# Patient Record
Sex: FEMALE | Race: WHITE | Employment: OTHER | URBAN - METROPOLITAN AREA
[De-identification: names, ages, dates, MRNs, and addresses within clinical notes are randomized per-mention and may not be internally consistent; named-entity substitution may affect disease eponyms.]

---

## 2017-02-20 ENCOUNTER — OFFICE VISIT (OUTPATIENT)
Dept: FAMILY MEDICINE CLINIC | Age: 77
End: 2017-02-20

## 2017-02-20 VITALS
HEIGHT: 63 IN | RESPIRATION RATE: 18 BRPM | HEART RATE: 91 BPM | TEMPERATURE: 96.4 F | DIASTOLIC BLOOD PRESSURE: 96 MMHG | SYSTOLIC BLOOD PRESSURE: 171 MMHG | OXYGEN SATURATION: 94 % | BODY MASS INDEX: 20.71 KG/M2 | WEIGHT: 116.9 LBS

## 2017-02-20 DIAGNOSIS — F17.200 TOBACCO USE DISORDER: ICD-10-CM

## 2017-02-20 DIAGNOSIS — Z80.41 FH: OVARIAN CANCER: ICD-10-CM

## 2017-02-20 DIAGNOSIS — J44.9 CHRONIC OBSTRUCTIVE PULMONARY DISEASE, UNSPECIFIED COPD TYPE (HCC): ICD-10-CM

## 2017-02-20 DIAGNOSIS — Z00.00 MEDICARE ANNUAL WELLNESS VISIT, SUBSEQUENT: Primary | ICD-10-CM

## 2017-02-20 DIAGNOSIS — Z13.39 SCREENING FOR ALCOHOLISM: ICD-10-CM

## 2017-02-20 DIAGNOSIS — E03.9 HYPOTHYROIDISM, UNSPECIFIED TYPE: ICD-10-CM

## 2017-02-20 DIAGNOSIS — E78.00 PURE HYPERCHOLESTEROLEMIA: ICD-10-CM

## 2017-02-20 DIAGNOSIS — R31.9 HEMATURIA: ICD-10-CM

## 2017-02-20 DIAGNOSIS — E55.9 VITAMIN D DEFICIENCY: ICD-10-CM

## 2017-02-20 NOTE — MR AVS SNAPSHOT
Visit Information Date & Time Provider Department Dept. Phone Encounter #  
 2/20/2017  8:15 AM Theora Alpers, MD [facility] [phone] [id number] Follow-up Instructions Return in about 8 months (around 10/20/2017) for Annual labs. Upcoming Health Maintenance Date Due  
 MEDICARE YEARLY EXAM 2/17/2017 GLAUCOMA SCREENING Q2Y 5/21/2017* BREAST CANCER SCRN MAMMOGRAM 5/21/2017* DTaP/Tdap/Td series (2 - Td) 1/21/2026 *Topic was postponed. The date shown is not the original due date. Allergies as of 2/20/2017  Review Complete On: 2/20/2017 By: Theora Alpers, MD  
  
 Severity Noted Reaction Type Reactions Fosamax [Alendronate] High 01/22/2013   Side Effect Other (comments) Jaw necrosis Combivent [Ipratropium-albuterol] Medium 01/22/2013   Side Effect Other (comments) Advil [Ibuprofen]  09/09/2009    Other (comments) Very sleepy Spiriva With Handihaler [Tiotropium Bromide]  01/22/2013    Swelling Sinus swelling Current Immunizations  Reviewed on 2/17/2016 Name Date Influenza High Dose Vaccine PF 8/16/2016, 10/4/2014 Influenza Vaccine 8/27/2015, 9/30/2013 Pneumococcal Conjugate (PCV-13) 4/23/2014 Pneumococcal Vaccine (Unspecified Type) 1/1/2004, 1/1/1992 TD Vaccine 5/29/2008 Tdap 1/21/2016 Not reviewed this visit You Were Diagnosed With   
  
 Codes Comments Medicare annual wellness visit, subsequent    -  Primary ICD-10-CM: Z00.00 ICD-9-CM: V70.0 Screening for alcoholism     ICD-10-CM: Z13.89 ICD-9-CM: V79.1 Elevated BP     ICD-10-CM: I10 
ICD-9-CM: 401.9 Hematuria     ICD-10-CM: R31.9 ICD-9-CM: 599.70 FH: ovarian cancer     ICD-10-CM: Z80.41 ICD-9-CM: V16.41 Vitamin D deficiency     ICD-10-CM: E55.9 ICD-9-CM: 268.9 Pure hypercholesterolemia     ICD-10-CM: E78.00 ICD-9-CM: 272.0  Chronic obstructive pulmonary disease, unspecified COPD type ([address] ICD-10-CM: J44.9 ICD-9-CM: 768 Tobacco use disorder     ICD-10-CM: F17.200 ICD-9-CM: 305.1 Hypothyroidism, unspecified type     ICD-10-CM: E03.9 ICD-9-CM: 321. 9 Vitals BP Pulse Temp Resp Height(growth percentile) Weight(growth percentile) (!) 171/96 (BP 1 Location: Left arm, BP Patient Position: Sitting) 91 96.4 °F (35.8 °C) (Oral) 18 5' 3\" (1.6 m) 116 lb 14.4 oz (53 kg) SpO2 BMI OB Status Smoking Status 94% 20.71 kg/m2 Postmenopausal Current Every Day Smoker Vitals History BMI and BSA Data Body Mass Index Body Surface Area 20.71 kg/m 2 1.53 m 2 Preferred Pharmacy Pharmacy Name Phone [facility] [address], [address] [phone] Your Updated Medication List  
  
   
This list is accurate as of: 2/20/17  9:05 AM.  Always use your most recent med list.  
  
  
  
  
 aspirin delayed-release 81 mg tablet Take  by mouth daily. ATROVENT HFA 17 mcg/actuation inhaler Generic drug:  ipratropium  
as needed. CENTRUM SILVER Tab tablet Generic drug:  multivitamins-minerals-lutein Take 1 Tab by mouth daily. cod liver oil Cap Take 1 Cap by mouth daily. denosumab 60 mg/mL injection Commonly known as:  Belkis Croon 60 mg by SubCUTAneous route every 6 months. levothyroxine 88 mcg tablet Commonly known as:  SYNTHROID  
TAKE 1 TABLET BY MOUTH PAL Y BEFORE BREAKFAST 6 DAYS PER WEEK.  
  
 red yeast rice extract 600 mg Cap Take 1,200 mg by mouth now. VITAMIN D3 2,000 unit Tab Generic drug:  cholecalciferol (vitamin D3) Take 2,000 Units by mouth daily. Follow-up Instructions Return in about 8 months (around 10/20/2017) for Annual labs. Introducing [facility]! Miryam Newman introduces [employer] patient portal. Now you can access parts of your medical record, email your doctor's office, and request medication refills online.    
 
1. In your internet browser, go to [email or web address] 2. Click on the First Time User? Click Here link in the Sign In box. You will see the New Member Sign Up page. 3. Enter your Odysii Access Code exactly as it appears below. You will not need to use this code after youve completed the sign-up process. If you do not sign up before the expiration date, you must request a new code. · Odysii Access Code: [email or web address] Expires: 5/21/2017  8:15 AM 
 
4. Enter the last four digits of your Social Security Number (xxxx) and Date of Birth (mm/dd/yyyy) as indicated and click Submit. You will be taken to the next sign-up page. 5. Create a YR Freet ID. This will be your Odysii login ID and cannot be changed, so think of one that is secure and easy to remember. 6. Create a Odysii password. You can change your password at any time. 7. Enter your Password Reset Question and Answer. This can be used at a later time if you forget your password. 8. Enter your e-mail address. You will receive e-mail notification when new information is available in [address] 9. Click Sign Up. You can now view and download portions of your medical record. 10. Click the Download Summary menu link to download a portable copy of your medical information. If you have questions, please visit the Frequently Asked Questions section of the Odysii website. Remember, Odysii is NOT to be used for urgent needs. For medical emergencies, dial 911. Now available from your iPhone and Android! Please provide this summary of care documentation to your next provider. Your primary care clinician is listed as [phone] If you have any questions after today's visit, please call [phone].

## 2017-02-20 NOTE — ACP (ADVANCE CARE PLANNING)
Advance Care Planning    Advance Care Planning (ACP) Provider Conversation Snapshot    Date of ACP Conversation: 02/20/17  Persons included in Conversation:  patient  Length of ACP Conversation in minutes:  <16 minutes (Non-Billable)    Authorized Decision Maker (if patient is incapable of making informed decisions): This person is:   Friend          For Patients with Decision Making Capacity:   Values/Goals: Exploration of values, goals, and preferences if recovery is not expected, even with continued medical treatment in the event of:  Imminent death  Severe, permanent brain injury    Conversation Outcomes / Follow-Up Plan:   Pt has DNR at home with contact info. Declines bringing to office to put on chart.

## 2017-02-20 NOTE — PROGRESS NOTES
HISTORY OF PRESENT ILLNESS  Jose Stuart is a 76 y.o. female. HPI   Here for MWV. Review of Systems   Constitutional: Positive for weight loss. HENT: Negative. Eyes: Negative. Respiratory: Positive for cough and shortness of breath. Cardiovascular: Negative. Gastrointestinal: Negative. Genitourinary: Negative. Musculoskeletal: Negative. Skin: Negative. Neurological: Negative. Endo/Heme/Allergies: Negative. Psychiatric/Behavioral: Negative. Physical Exam   NA    ASSESSMENT and PLAN  See below     This is a Subsequent Medicare Annual Wellness Visit providing Personalized Prevention Plan Services (PPPS) (Performed 12 months after initial AWV and PPPS )    I have reviewed the patient's medical history in detail and updated the computerized patient record. Eye doctor 2 x annually for chronic steroid use by pulm. No dentist as full lower. Never had colonoscopy and no intention to do nor to do stool cards. Mammo past yr. DEXA past yr. No signif hx past yr.     History     Past Medical History   Diagnosis Date    Abnormal chest CT      7/14/14 notes rec'd radow-nodules present    Abscess, ear canal 01/31/2017     Dr Heriberto Gusman  left ear    Breast calcifications [place]     COPD (chronic obstructive pulmonary disease) ([facility] 09/09/2009     Dr Twan Herring   9/26/16    Dizziness 7/21/15     [facility] notes [facility]    Heart murmur 10/28/09     grade I/VI @ [facility]    Hematuria - cause not known     Hypercholesterolemia     Hypoxemia      radow uses O2 at night     Osteoporosis                 3/20/25 note     1/13/15 dexa scan rec'd- ref to [address] Pneumothorax 7/21/14 8/20/14     admission notes from 2 visits [facility]    Pneumothorax, chronic      Dr. Kathleen Chambers Thyroid disease     Tobacco use disorder 9/9/2009    Unspecified hypothyroidism 9/9/2009    Vitamin D deficiency       Past Surgical History Procedure Laterality Date    Pr breast surgery procedure unlisted  '70s-90's     sclerosing adenosis x 5 bilat. dr. Arleen Arreola Pr chest surgery procedure unlisted  '70-80s     Repaired spon. pneumothoraces x 2    Hx heent  as child     T&A    Hx heent  many years ago/dr unknown     bilateral cataracts, rt first,     Hx heent  unknown/many years ago over 19     dental extractions, Dr. Erik Fraga Hx orthopaedic  age 27's     tore rt knee cartilage     Current Outpatient Prescriptions   Medication Sig Dispense Refill    levothyroxine (SYNTHROID) 88 mcg tablet TAKE 1 TABLET BY MOUTH PAL Y BEFORE BREAKFAST 6 DAYS PER WEEK. 90 Tab 2    denosumab (PROLIA) 60 mg/mL injection 60 mg by SubCUTAneous route every 6 months.  ATROVENT HFA 17 mcg/actuation inhaler as needed. 3    multivitamins-minerals-lutein (CENTRUM SILVER) tab tablet Take 1 Tab by mouth daily.  cod liver oil cap Take 1 Cap by mouth daily.  red yeast rice extract 600 mg cap Take 1,200 mg by mouth now.  cholecalciferol, vitamin D3, (VITAMIN D3) 2,000 unit Tab Take 2,000 Units by mouth daily.  aspirin delayed-release 81 mg tablet Take  by mouth daily.          Allergies   Allergen Reactions    Fosamax [Alendronate] Other (comments)     Jaw necrosis    Combivent [Ipratropium-Albuterol] Other (comments)    Advil [Ibuprofen] Other (comments)     Very sleepy    Spiriva With Handihaler [Tiotropium Bromide] Swelling     Sinus swelling     Family History   Problem Relation Age of Onset    Cancer Mother      ?uterine    Cancer Maternal Grandmother      stomach    Cancer Brother      Social History   Substance Use Topics    Smoking status: Current Every Day Smoker     Packs/day: 1.00     Years: 60.00    Smokeless tobacco: Never Used    Alcohol use No     Patient Active Problem List   Diagnosis Code    COPD (chronic obstructive pulmonary disease) ([facility]) J44.9    Tobacco use disorder F17.200    Hypothyroidism E03.9    Pure hypercholesterolemia E78.00    FH: ovarian cancer Z80.41    Vitamin D deficiency E55.9    Hematuria R31.9    Elevated BP I10       Depression Risk Factor Screening:     PHQ 2 / 9, over the last two weeks 2/20/2017   Little interest or pleasure in doing things Not at all   Feeling down, depressed or hopeless Not at all   Total Score PHQ 2 0     Alcohol Risk Factor Screening: On any occasion during the past 3 months, have you had more than 3 drinks containing alcohol? Not applicable    Do you average more than 7 drinks per week? Not applicable    Non drinker    Functional Ability and Level of Safety:     Hearing Loss   borderline normal    Activities of Daily Living   Self-care. Requires assistance with: no ADLs    Fall Risk     Fall Risk Assessment, last 12 mths 2/20/2017   Able to walk? Yes   Fall in past 12 months? No     Abuse Screen   Patient is not abused    Review of Systems   See above    Physical Examination     Evaluation of Cognitive Function:  Mood/affect:  neutral  Appearance: age appropriate, casually dressed and within normal Limits  Family member/caregiver input: NA    No exam performed today, NA. Patient Care Team:  Maricruz Goodrich MD as PCP - Marky Herr MD (Pulmonary Disease)  Naomie Roque MD (Internal Medicine)  Colton Ivey MD (Ophthalmology)    Advice/Referrals/Counseling   Education and counseling provided:  Are appropriate based on today's review and evaluation  End-of-Life planning (with patient's consent)  Pneumococcal Vaccine  Influenza Vaccine  Screening Mammography  Cardiovascular screening blood test  Bone mass measurement (DEXA)  Screening for glaucoma  Diabetes screening test    Assessment/Plan       ICD-10-CM ICD-9-CM    1. Medicare annual wellness visit, subsequent Z00.00 V70.0    2. Screening for alcoholism Z13.89 V79.1    3. Elevated BP I10 401.9    4. Hematuria R31.9 599.70    5. FH: ovarian cancer Z80.41 V16.41    6.  Vitamin D deficiency E55.9 268.9    7. Pure hypercholesterolemia E78.00 272.0    8. Chronic obstructive pulmonary disease, unspecified COPD type ([facility] J44.9 496    9. Tobacco use disorder F17.200 305.1    10. Hypothyroidism, unspecified type E03.9 244.9      Follow-up Disposition:  Return in about 8 months (around 10/20/2017) for Annual labs. Kurtis Sin

## 2017-02-20 NOTE — PROGRESS NOTES
Chief Complaint   Patient presents with   [facility] Annual Wellness Visit     1. Have you been to the ER, urgent care clinic since your last visit? Hospitalized since your last visit? No    2. Have you seen or consulted any other health care providers outside of the [address] [place] [address] since your last visit? Include any pap smears or colon screening. Yes When: [address] Where: 1/20/17 Reason for visit: Judith Maria Elenat and Bone Density. I have reviewed Health Maintenance with the patient and updated. Patient has a Living Will. Family member is aware of her wishes.

## 2017-07-10 RX ORDER — LEVOTHYROXINE SODIUM 88 UG/1
TABLET ORAL
Qty: 90 TAB | Refills: 0 | Status: SHIPPED | OUTPATIENT
Start: 2017-07-10 | End: 2018-02-21 | Stop reason: DRUGHIGH

## 2017-07-10 NOTE — TELEPHONE ENCOUNTER
She has an appt on the schedule in October for follow up-she has her [address] in Feb. Labs due with the October visit

## 2017-07-11 ENCOUNTER — TELEPHONE (OUTPATIENT)
Dept: FAMILY MEDICINE CLINIC | Age: 77
End: 2017-07-11

## 2017-07-11 NOTE — TELEPHONE ENCOUNTER
We rec'd the request electronically and okay'd that way.  Left message at [place] that it was to be for Synthroid

## 2017-07-11 NOTE — TELEPHONE ENCOUNTER
Patient calling to say she went to the pharmacy to  her thyroid medication and its the wrong medication. She is requesting a return call. Her contact # is [phone].

## 2017-08-17 ENCOUNTER — OFFICE VISIT (OUTPATIENT)
Dept: FAMILY MEDICINE CLINIC | Age: 77
End: 2017-08-17

## 2017-08-17 VITALS
SYSTOLIC BLOOD PRESSURE: 108 MMHG | WEIGHT: 114 LBS | HEIGHT: 63 IN | OXYGEN SATURATION: 96 % | HEART RATE: 94 BPM | TEMPERATURE: 97.2 F | RESPIRATION RATE: 18 BRPM | BODY MASS INDEX: 20.2 KG/M2 | DIASTOLIC BLOOD PRESSURE: 79 MMHG

## 2017-08-17 DIAGNOSIS — F17.200 TOBACCO USE DISORDER: ICD-10-CM

## 2017-08-17 DIAGNOSIS — J44.9 CHRONIC OBSTRUCTIVE PULMONARY DISEASE, UNSPECIFIED COPD TYPE (HCC): Primary | ICD-10-CM

## 2017-08-17 RX ORDER — IPRATROPIUM BROMIDE AND ALBUTEROL SULFATE 2.5; .5 MG/3ML; MG/3ML
3 SOLUTION RESPIRATORY (INHALATION) 2 TIMES DAILY
COMMUNITY

## 2017-08-17 NOTE — PROGRESS NOTES
Chief Complaint   Patient presents with   [facility] Follow Up     [facility] EMERGENCY [facility]ENTER admitted 7/28/17 - 8/1/17 for dyspnea     There are no preventive care reminders to display for this patient. Coordination of Care Questions    1. Have you been to the ER, urgent care clinic since your last visit? No       Hospitalized since your last visit? [facility] see above    2. Have you seen or consulted any other health care providers outside of the [address] [place] [address] since your last visit? Include any pap smears or colon screening.  No

## 2017-08-17 NOTE — PROGRESS NOTES
Breathing pb began day before went to ER as got worse. Transported by EMTs. Put back on neb. Pt doing BID with benefit. Pulm is Dr. Anahi Cook. Has f/u appt in Oct.  Much improved. Visit Vitals    /79 (BP 1 Location: Left arm, BP Patient Position: Sitting)    Pulse 94    Temp 97.2 °F (36.2 °C) (Oral)    Resp 18    Ht 5' 3\" (1.6 m)    Wt 114 lb (51.7 kg)    SpO2 96%    BMI 20.19 kg/m2       Patient alert and cooperative. Distant breath sounds, but otherwise clear. Assessment:  1. Recent hospitalization for COPD exacerbation. Plan:  1. Continue current meds and follow up with pulmonary scheduled. 2. Due back here in January for annual visit. 3. Follow otherwise here prn.

## 2017-10-20 ENCOUNTER — LAB ONLY (OUTPATIENT)
Dept: FAMILY MEDICINE CLINIC | Age: 77
End: 2017-10-20

## 2017-10-20 DIAGNOSIS — E78.00 PURE HYPERCHOLESTEROLEMIA: ICD-10-CM

## 2017-10-20 DIAGNOSIS — E03.9 HYPOTHYROIDISM, UNSPECIFIED TYPE: Primary | ICD-10-CM

## 2017-10-20 DIAGNOSIS — R31.9 HEMATURIA, UNSPECIFIED TYPE: ICD-10-CM

## 2017-10-20 DIAGNOSIS — E55.9 VITAMIN D DEFICIENCY: ICD-10-CM

## 2017-12-05 ENCOUNTER — PATIENT OUTREACH (OUTPATIENT)
Dept: FAMILY MEDICINE CLINIC | Age: 77
End: 2017-12-05

## 2017-12-05 NOTE — PROGRESS NOTES
NN Note    - Pt listed on today's CDR (MSSP-P). Admission to [facility] 12/4/17 (Observation Status)  - ClariFI system reviewed. Printed copy of 12/4/17 ED Provider Report, H&P, Labs, CXR, 12/5/17 Cardio Progress Note. Records reviewed & forwarded to PCP  - Per documentation Dx- A-Fib w/ RVR, rate 189. Cardioversion in ED. Converted to sinus rhythm. Sinus tach, rate 108    - Labs- POC Lactic Acid- 2.89 (H) @ 1828 PM. Rechecked- 1.6 @ 2300. WBC- 11.87(H). UA- Ketones- trace, blood- small, Protein- 30 mg/dL, Leuk Sabrina- trace, RBC-3-5.  - Plan- admit under observation on ASA. Consider anticoagulation if has recurrent A-Fib. D/C in morning if clinically stable  - Per 12/5/17 Cardio Progress report- not stable for discharge. Recurrent A-Fib.  Digoxin IV x2, oral Dilt today, Xarelto po.  - NN to continue to monitor ClariFI system for D/C date/disposition/plan & SHANIKA f/u

## 2017-12-12 ENCOUNTER — PATIENT OUTREACH (OUTPATIENT)
Dept: FAMILY MEDICINE CLINIC | Age: 77
End: 2017-12-12

## 2017-12-12 NOTE — PROGRESS NOTES
Hospital Discharge [facility] Follow-Up    Celestina Lorenz is a 76 y. o.female. Last PCP f/u 17. Referral source: CDR (MSSP-P). Patient hospitalized @ [facility] 17-17. [facility]ack system reviewed. Printed copy of Discharge Summary & Echocardiogram Report. Records to be forwarded to PCP for review. RRAT score: unknown     Diagnosis:  A-Fib with RVR, requiring direct current cardioversion & sedation in the ER    Procedures:  2DEchocardiogram  Cardioversion in ER by ER staff    Discharge Instructions/Plans:  - Disposition- home  - Meds- new- Xarelto 20 mg daily, Diltiazem 24 hr 180 mg. Discontinue: ASA 81 mg. Red Yeast Rice  - Cardio f/u in 7-14 days with Dr Debi RAZA post hospital interactive contact done by telephone 4 business days after discharge (17)    Spoke with pt. Pt ID verified with 2 identifiers, name and . NN introduction. Reason for call stated. Patient's challenges to self management identified: level of motivation re smoking cessation   Medication Management: good adherence, good understanding,    Summary of patients top three problems:     Problem 1: A-Fib- pt reported feeling \"fine\". Denied palpitations. SOB @ baseline. \"Not any more than usual\". Has home BP monitor. Hasn't checked BP since d/c. Started on [facility]. Pt reported still taking ASA 81mg. Advised med d/c'd @ d/c. Pt not aware. Also still taking Red Yeast Rice. Advised this was d/c'd @ discharge. \"Why, it's for Cholesterol\". NN advised pt discuss with Cardio. Pt verbalized understanding. Plan- pt t contact [address] [facility] on 17 to inquire about ASA & Red Yeast Rice, discontinue or continue. Post hosp Cardio f/u scheduled for 17 @ 9:20 AM with Dr Debi Moore. Problem 2: COPD- pt reported breathing \"back to my normal\". Has home Pulse Ox monitor. O2 Sat \"98%\". Has Nebulizer. Uses PRN. Last used today ~ 12 PM. Had been out. Exposed to cold air. Felt better after Neb tx.  Uses O2 @ night 2.5-3L/min via NC. lCOPD managed by Dr Josué Meza @ [facility]. Last ov 10/2/17. Given trial of Incruse Ellipta 62.5 mg. Unable to tolerate after 3-4 days. Notified Pulmon office. Advised to continue with Atrovent Inhaler & Neb txs. Next Pulmon f/u April 2018 (6 months after last f/u on 10/2/17). Problem 3: Tobacco Abuse- pt reported continues to smoke 1 pack/day. Acknowledges has been counseled to quit. Understands smoking contributes to disease progression & complications. Has tried Hypnosis, Acupuncture, & program similar to Quit Now w/o success. \"It's a habit I'm not ready to give up. It's selfish. It's the only vice I have\". Pt again counseled om importance of quitting. Pt verbalized understanding. Plan- pt agreeable with reaching out to this NN if/when decides to attempt cessation. Advance Care Planning:   Patient was offered the opportunity to discuss advance care planning:  yes     Does patient have an Advance Directive:  Pt reported does have, but declines to provide copy. If no, did you provide information on Advance Care Planning?  [id number] [address] [place], Referrals, and Durable Medical Equipment: has home O2, Nebulizer. Home BP monitor, home Pulse Ox. Support System: friends    Goals Addressed      Knowledge and adherence of prescribed medication (ie. action, side effects, missed dose, etc.).                  12/12/17- pt to contact Owned it office on 12/13/17 re clarification on discontinuation/continuation of ASA 81 mg & Red Yeast Rice-ID       Patient verbalizes understanding of self-management goals of living with COPD.                  12/12/17- pt reported breathing \"back to my normal\". Has home Pulse Ox monitor. O2 Sat \"98%\". Has Nebulizer. Uses PRN. Last used today ~ 12 PM. Had been out. Exposed to cold air. Felt better after Neb tx. Uses O2 @ night 2.5-3L/min via NC. COPD managed by Dr Josué Meza @ [facility]. Last ov 10/2/17. Given trial of Incruse Ellipta 62.5 mg.  Unable to tolerate after 3-4 days. Notified Pulmon office. Advised to continue with Atrovent Inhaler & Neb txs. Next Pulmon f/u April 2018 (6 months after last f/u on 10/2/17)-ID.  Patient/ Verbalizes Understanding of self-management of A-Fib (pt-stated)                  12/12/17- take [facility] Xarelto as prescribed. Familiarize self on red flags of OAC ie bleeding & when to contact Cardio. Familiarize self on s/s of uncontrolled A-Fib & when to contact Cardio. Monitor BP & pulse daily. Keep log. Present log @ Cardio f/u for review. Pt verbalized understanding-ID        Prevent complications post hospitalization. 12/12/17-  NN /u during 30 day post hosp SHANIKA period per protocol. Collaborate with Specialists as needed-ID       Smoking cessation. 12/12/17- pt reported continues to smoke 1 pack/day. Acknowledges has been counseled to quit. Understands smoking contributes to disease progression & complications. Has tried Hypnosis, Acupuncture, & program similar to Quit Now w/o success. \"It's a habit I'm not ready to give up. It's selfish. It's the only vice I have\". Pt again counseled om importance of quitting. Pt verbalized understanding. Plan- pt agreeable with reaching out to this NN if/when decides to attempt cessation-ID. Follow up appointments:  Cardio 12/22/17. PCP f/u scheduled for 2/21/18 with Dr Junie Fernandes for annual [address] (last done 2/20/17). Patient verbalized understanding of all information discussed.    Patient has this Nurse Navigator's contact information for any further questions, concerns, or needs.

## 2017-12-13 NOTE — ACP (ADVANCE CARE PLANNING)
Pt reported has an ACP document. Declines to provide copy to this office. \"The person who needs to know about it does. I have the document @ home. I want to keep it this way\".

## 2018-02-01 LAB — CREATININE, EXTERNAL: 0.82

## 2018-02-21 ENCOUNTER — OFFICE VISIT (OUTPATIENT)
Dept: FAMILY MEDICINE CLINIC | Age: 78
End: 2018-02-21

## 2018-02-21 VITALS
RESPIRATION RATE: 18 BRPM | TEMPERATURE: 98.1 F | BODY MASS INDEX: 20 KG/M2 | SYSTOLIC BLOOD PRESSURE: 149 MMHG | HEIGHT: 63 IN | OXYGEN SATURATION: 91 % | HEART RATE: 65 BPM | DIASTOLIC BLOOD PRESSURE: 96 MMHG | WEIGHT: 112.9 LBS

## 2018-02-21 DIAGNOSIS — E03.9 HYPOTHYROIDISM, UNSPECIFIED TYPE: ICD-10-CM

## 2018-02-21 DIAGNOSIS — E55.9 VITAMIN D DEFICIENCY: ICD-10-CM

## 2018-02-21 DIAGNOSIS — Z00.00 MEDICARE ANNUAL WELLNESS VISIT, SUBSEQUENT: Primary | ICD-10-CM

## 2018-02-21 DIAGNOSIS — E78.00 PURE HYPERCHOLESTEROLEMIA: ICD-10-CM

## 2018-02-21 DIAGNOSIS — F17.200 TOBACCO USE DISORDER: ICD-10-CM

## 2018-02-21 DIAGNOSIS — J44.9 CHRONIC OBSTRUCTIVE PULMONARY DISEASE, UNSPECIFIED COPD TYPE (HCC): ICD-10-CM

## 2018-02-21 RX ORDER — LEVOTHYROXINE SODIUM 75 UG/1
TABLET ORAL
Refills: 0 | COMMUNITY
Start: 2018-02-05

## 2018-02-21 NOTE — MR AVS SNAPSHOT
[address] [place] 
[address] [place] [address] 
[phone] Patient: Sara Yadav MRN:  [id number] Visit Information Date & Time Provider Department Dept. Phone Encounter #  
 2/21/2018 10:20 AM Mary Abdi MD [facility] [phone] [id number] Follow-up Instructions Return in about 1 year (around 2/21/2019) for [address] Routing History Upcoming Health Maintenance Date Due  
 MEDICARE YEARLY EXAM 2/21/2018 BREAST CANCER SCRN MAMMOGRAM 5/22/2018* GLAUCOMA SCREENING Q2Y 2/20/2019 DTaP/Tdap/Td series (2 - Td) 1/21/2026 *Topic was postponed. The date shown is not the original due date. Allergies as of 2/21/2018  Review Complete On: 2/21/2018 By: Mary Abdi MD  
  
 Severity Noted Reaction Type Reactions Fosamax [Alendronate] High 01/22/2013   Side Effect Other (comments) Jaw necrosis Combivent [Ipratropium-albuterol] Medium 01/22/2013   Side Effect Other (comments) Advil [Ibuprofen]  09/09/2009    Other (comments) Very sleepy Corticosteroids (Glucocorticoids)  08/17/2017    Shortness of Breath Inhaled only, may take oral  
 Diltiazem  02/21/2018    Other (comments) Got ucky -felt like she was in a fog Spiriva With Handihaler [Tiotropium Bromide]  01/22/2013    Swelling Sinus swelling Xarelto [Rivaroxaban]  02/21/2018    Other (comments) Bleeding Current Immunizations  Reviewed on 2/21/2018 Name Date Influenza High Dose Vaccine PF 9/20/2017, 8/16/2016, 10/4/2014 Influenza Vaccine 8/27/2015, 9/30/2013 Pneumococcal Conjugate (PCV-13) 4/23/2014 Pneumococcal Polysaccharide (PPSV-23) 1/1/2004, 1/1/1992 TD Vaccine 5/29/2008 Tdap 1/21/2016 Reviewed by Jacinto Cuba RN on 2/21/2018 at 10:40 AM  
You Were Diagnosed With   
  
 Codes Comments Tobacco use disorder    -  Primary ICD-10-CM: B97.190 ICD-9-CM: 305.1 Chronic obstructive pulmonary disease, unspecified COPD type ([facility]     ICD-10-CM: J44.9 ICD-9-CM: 333 Hypothyroidism, unspecified type     ICD-10-CM: E03.9 ICD-9-CM: 244.9 Pure hypercholesterolemia     ICD-10-CM: E78.00 ICD-9-CM: 272.0 Vitamin D deficiency     ICD-10-CM: E55.9 ICD-9-CM: 268.9 Vitals BP Pulse Temp Resp Height(growth percentile) Weight(growth percentile) (!) 149/96 (BP 1 Location: Left arm, BP Patient Position: Sitting) 65 98.1 °F (36.7 °C) (Oral) 18 5' 3\" (1.6 m) 112 lb 14.4 oz (51.2 kg) SpO2 BMI OB Status Smoking Status 91% 20 kg/m2 Postmenopausal Current Every Day Smoker Vitals History BMI and BSA Data Body Mass Index Body Surface Area  
 20 kg/m 2 1.51 m 2 Preferred Pharmacy Pharmacy Name Phone [id number]- Joseph Pascual, [address] [place] [phone] Your Updated Medication List  
  
   
This list is accurate as of 2/21/18 11:03 AM.  Always use your most recent med list.  
  
  
  
  
 albuterol-ipratropium 2.5 mg-0.5 mg/3 ml Nebu Commonly known as:  DUO-NEB  
3 mL by Nebulization route two (2) times a day. Indications: CHRONIC OBSTRUCTIVE PULMONARY DISEASE WITH BRONCHOSPASMS  
  
 aspirin delayed-release 81 mg tablet Take  by mouth daily. ATROVENT HFA 17 mcg/actuation inhaler Generic drug:  ipratropium  
as needed. CENTRUM SILVER Tab tablet Generic drug:  multivitamins-minerals-lutein Take 1 Tab by mouth daily. cod liver oil Cap Take 1 Cap by mouth daily. denosumab 60 mg/mL injection Commonly known as:  Aretta Bucy 60 mg by SubCUTAneous route every 6 months. red yeast rice extract 600 mg Cap Take 1,200 mg by mouth daily. SYNTHROID 75 mcg tablet Generic drug:  levothyroxine 1 TABLET BY MOUTH ON AN EMPTY STOMACH IN THE MORNING ONCE A DAY EXCEPT SUNDAY  
  
 VITAMIN D3 2,000 unit Tab Generic drug:  cholecalciferol (vitamin D3) Take 2,000 Units by mouth daily. Follow-up Instructions Return in about 1 year (around 2/21/2019) for Dov [address]. Patient Instructions Medicare Wellness Visit, Female The best way to live healthy is to have a healthy lifestyle by eating a well-balanced diet, exercising regularly, limiting alcohol and stopping smoking. Regular physical exams and screening tests are another way to keep healthy. Preventive exams provided by your health care provider can find health problems before they become diseases or illnesses. Preventive services including immunizations, screening tests, monitoring and exams can help you take care of your own health. All people over age 72 should have a pneumovax  and and a prevnar shot to prevent pneumonia. These are once in a lifetime unless you and your provider decide differently. All people over 65 should have a yearly flu shot and a tetanus vaccine every 10 years. A bone mass density to screen for osteoporosis or thinning of the bones should be done every 2 years after 65. Screening for diabetes mellitus with a blood sugar test should be done every year. Glaucoma is a disease of the eye due to increased ocular pressure that can lead to blindness and it should be done every year by an eye professional. 
 
Cardiovascular screening tests that check for elevated lipids (fatty part of blood) which can lead to heart disease and strokes should be done every 5 years. Colorectal screening that evaluates for blood or polyps in your colon should be done yearly as a stool test or every five years as a flexible sigmoidoscope or every 10 years as a colonoscopy up to age 76. Breast cancer screening with a mammogram is recommended biennially  for women age 54-69.  
 
Screening for cervical cancer with a pap smear and pelvic exam is recommended for women after age 72 years every 2 years up to age 79 or when the provider and patient decide to stop. If there is a history of cervical abnormalities or other increased risk for cancer then the test is recommended yearly. Hepatitis C screening is also recommended for anyone born between [birth year] through [address]. A shingles vaccine is also recommended once in a lifetime after age 61. Your Medicare Wellness Exam is recommended annually. Here is a list of your current Health Maintenance items with a due date: 
Health Maintenance Due Topic Date Due  
 Annual Well Visit  02/21/2018 Introducing [facility]! Hiram Mahoney introduces [employer] patient portal. Now you can access parts of your medical record, email your doctor's office, and request medication refills online. 1. In your internet browser, go to [email or web address]. [email or web address] 2. Click on the First Time User? Click Here link in the Sign In box. You will see the New Member Sign Up page. 3. Enter your [employer] Access Code exactly as it appears below. You will not need to use this code after youve completed the sign-up process. If you do not sign up before the expiration date, you must request a new code. · [employer] Access Code: [email or web address] 
Expires: 5/22/2018 11:03 AM 
 
4. Enter the last four digits of your Social Security Number (xxxx) and Date of Birth (mm/dd/yyyy) as indicated and click Submit. You will be taken to the next sign-up page. 5. Create a [employer] ID. This will be your [employer] login ID and cannot be changed, so think of one that is secure and easy to remember. 6. Create a [employer] password. You can change your password at any time. 7. Enter your Password Reset Question and Answer. This can be used at a later time if you forget your password. 8. Enter your e-mail address. You will receive e-mail notification when new information is available in [address] 9. Click Sign Up. You can now view and download portions of your medical record. 10. Click the Download Summary menu link to download a portable copy of your medical information. If you have questions, please visit the Frequently Asked Questions section of the [email or web address] website. Remember, [email or web address] is NOT to be used for urgent needs. For medical emergencies, dial 911. Now available from your iPhone and Android! Please provide this summary of care documentation to your next provider. Your primary care clinician is listed as Donnie Santana Dr. If you have any questions after today's visit, please call [phone].

## 2018-02-21 NOTE — ASSESSMENT & PLAN NOTE
This condition is managed by Specialist.  Key COPD Medications             albuterol-ipratropium (DUO-NEB) 2.5 mg-0.5 mg/3 ml nebu  (Taking) 3 mL by Nebulization route two (2) times a day. Indications: CHRONIC OBSTRUCTIVE PULMONARY DISEASE WITH BRONCHOSPASMS    ATROVENT HFA 17 mcg/actuation inhaler  (Taking) as needed.         Lab Results   Component Value Date/Time    WBC 11.2 09/28/2016 03:53 PM    HGB 15.4 09/28/2016 03:53 PM    HCT 46.7 09/28/2016 03:53 PM    PLATELET 567 48/68/5926 03:53 PM

## 2018-02-21 NOTE — PROGRESS NOTES
[place]ie [place]  Identified pt with two pt identifiers(name and ). Chief Complaint   Patient presents with   [facility] Annual Wellness Visit       1. Have you been to the ER, urgent care clinic since your last visit? Hospitalized since your last visit? Yes [address] 17    2. Have you seen or consulted any other health care providers outside of the [place] [address] since your last visit? Include any pap smears or colon screening. NO    Today's provider has been notified of reason for visit, vitals and flowsheets obtained on patients.      Patient received paperwork for advance directive during previous visit but has not completed at this time     Reviewed record In preparation for visit, huddled with provider and have obtained necessary documentation      Health Maintenance Due   Topic    BREAST CANCER SCRN MAMMOGRAM     MEDICARE YEARLY EXAM        Wt Readings from Last 3 Encounters:   18 112 lb 14.4 oz (51.2 kg)   17 114 lb (51.7 kg)   17 116 lb 14.4 oz (53 kg)     Temp Readings from Last 3 Encounters:   17 97.2 °F (36.2 °C) (Oral)   17 96.4 °F (35.8 °C) (Oral)   16 98 °F (36.7 °C) (Oral)     BP Readings from Last 3 Encounters:   17 108/79   17 (!) 171/96   16 158/89     Pulse Readings from Last 3 Encounters:   17 94   17 91   16 89     Vitals:    18 1029   Weight: 112 lb 14.4 oz (51.2 kg)   Height: 5' 3\" (1.6 m)   PainSc:   0 - No pain         Learning Assessment:  :     Learning Assessment 2015   PRIMARY LEARNER Patient   HIGHEST LEVEL OF EDUCATION - PRIMARY LEARNER  GRADUATED HIGH SCHOOL OR GED   BARRIERS PRIMARY LEARNER NONE   CO-LEARNER CAREGIVER No   PRIMARY LANGUAGE ENGLISH    NEED No   LEARNER PREFERENCE PRIMARY READING   ANSWERED BY patient   RELATIONSHIP SELF       Depression Screening:  :     PHQ over the last two weeks 2018   Little interest or pleasure in doing things Not at all   Feeling down, depressed or hopeless Not at all   Total Score PHQ 2 0       Fall Risk Assessment:  :     Fall Risk Assessment, last 12 mths 2/21/2018   Able to walk? Yes   Fall in past 12 months? No   Fall with injury? -   Number of falls in past 12 months -   Fall Risk Score -       Abuse Screening:  :     Abuse Screening Questionnaire 8/17/2017 2/17/2016   Do you ever feel afraid of your partner? N N   Are you in a relationship with someone who physically or mentally threatens you? N N   Is it safe for you to go home? Y Y       ADL Screening:  :     ADL Assessment 8/17/2017   Feeding yourself No Help Needed   Getting from bed to chair No Help Needed   Getting dressed No Help Needed   Bathing or showering No Help Needed   Walk across the room (includes cane/walker) No Help Needed   Using the telphone No Help Needed   Taking your medications No Help Needed   Preparing meals No Help Needed   Managing money (expenses/bills) No Help Needed   Moderately strenuous housework (laundry) No Help Needed   Shopping for personal items (toiletries/medicines) No Help Needed   Shopping for groceries No Help Needed   Driving No Help Needed   Climbing a flight of stairs No Help Needed   Getting to places beyond walking distances No Help Needed       Complete Physical Exam Female  Pre-Visit Questions:    1. Do you follow a low fat or low salt diet ? y  2. Do you follow an exercise program? y  3. Have you had your tetanus booster in the last 10 years? y  3. Have you ever had a Pneumonia vaccine? y  11. Do you smoke? y  6. Do you consider yourself overweight? n  7. Do you perform Breast self exam?n  8. Is there a family history of CAD< age 48? n  5. Is there a family history of Cancer? y  8. Do you have any Cancer risks? n  11. Have you had a colonoscopy? n  12. Have you been to your eye doctor past year? y    15. Have you been to your dentist in the last 6 months? n   14. Have you had your flu shot for this season?  y  13.   Have you had a Pap smear in the last 3 years?n  16. Have you had your annual mammogram?y  17. Have you had a bone density scan(DEXA)? y          Medication reconciliation up to date and corrected with patient at this time.

## 2018-02-21 NOTE — PATIENT INSTRUCTIONS

## 2018-02-21 NOTE — PROGRESS NOTES
This is a Subsequent Medicare Annual Wellness Exam (AWV) (Performed 12 months after IPPE or effective date of Medicare Part B enrollment)    I have reviewed the patient's medical history in detail and updated the computerized patient record. Eye doctor  2 x past yr. No dentist.  Full upper and lower. No previous colonoscopy. Declines stool testing. Mammo past yr. DEXA q 2 yrs. Prolia 2 x annually. Pulm 2 x annually. Endo 2 x annually for fu osteoporosis. Card 2 x annually for new onset afib. ER for afib and hosp 3 days at [facility] past Dec.  Had ER and hosp for COPD past Aug.  No other signif hx past yr. History     Past Medical History:   Diagnosis Date    Abnormal chest CT     7/14/14 notes rec'd radow-nodules present    Abscess, ear canal 01/31/2017    Dr Jose Cruz Grissom  left ear    Atrial fibrillation ([facility] 12/04/2017    [place]     COPD (chronic obstructive pulmonary disease) ([facility] 09/09/2009    Dr Harris Bound   4/3/17 f/u note    Dizziness 7/21/15    [facility] notes BPP[facility]    Heart murmur 10/28/09    grade I/VI @ [facility]    Hematuria - cause not known     Hypercholesterolemia     Hypoxemia     radow uses O2 at night     Osteoporosis                 3/20/25 note    1/13/15 dexa scan rec'd- ref to [address] Pneumothorax 7/21/14 8/20/14    admission notes from 2 visits [facility]    Pneumothorax, chronic     Dr. Rosy Francois Screening for glaucoma 02/20/2017    Thyroid disease     Tobacco use disorder 9/9/2009    Unspecified hypothyroidism 9/9/2009    Vitamin D deficiency       Past Surgical History:   Procedure Laterality Date    BREAST SURGERY PROCEDURE UNLISTED  '70s-90's    sclerosing adenosis x 5 bilat. dr. kaylee Monte  '70-80s    Repaired spon.  pneumothoraces x 2    HX HEENT  as child    T&A    HX HEENT  many years ago/dr unknown    bilateral cataracts, rt first,     HX HEENT unknown/many years ago over 20    dental extractions, Dr. Baldev Pineda  age 27's    tore rt knee cartilage     Current Outpatient Prescriptions   Medication Sig Dispense Refill    SYNTHROID 75 mcg tablet 1 TABLET BY MOUTH ON AN EMPTY STOMACH IN THE MORNING ONCE A DAY EXCEPT SUNDAY  0    albuterol-ipratropium (DUO-NEB) 2.5 mg-0.5 mg/3 ml nebu 3 mL by Nebulization route two (2) times a day. Indications: CHRONIC OBSTRUCTIVE PULMONARY DISEASE WITH BRONCHOSPASMS      denosumab (PROLIA) 60 mg/mL injection 60 mg by SubCUTAneous route every 6 months.  ATROVENT HFA 17 mcg/actuation inhaler as needed. 3    multivitamins-minerals-lutein (CENTRUM SILVER) tab tablet Take 1 Tab by mouth daily.  cod liver oil cap Take 1 Cap by mouth daily.  red yeast rice extract 600 mg cap Take 1,200 mg by mouth daily.  cholecalciferol, vitamin D3, (VITAMIN D3) 2,000 unit Tab Take 2,000 Units by mouth daily.  aspirin delayed-release 81 mg tablet Take  by mouth daily.          Allergies   Allergen Reactions    Fosamax [Alendronate] Other (comments)     Jaw necrosis    Combivent [Ipratropium-Albuterol] Other (comments)    Advil [Ibuprofen] Other (comments)     Very sleepy    Corticosteroids (Glucocorticoids) Shortness of Breath     Inhaled only, may take oral    Diltiazem Other (comments)     Got ucky -felt like she was in a fog    Spiriva With Handihaler [Tiotropium Bromide] Swelling     Sinus swelling    Xarelto [Rivaroxaban] Other (comments)     Bleeding     Family History   Problem Relation Age of Onset    Cancer Mother      ?uterine    Cancer Maternal Grandmother      stomach    Cancer Brother      Social History   Substance Use Topics    Smoking status: Current Every Day Smoker     Packs/day: 1.00     Years: 60.00    Smokeless tobacco: Never Used    Alcohol use No     Patient Active Problem List   Diagnosis Code    COPD (chronic obstructive pulmonary disease) ([facility]) J44.9    Tobacco use disorder F17.200    Hypothyroidism E03.9    Pure hypercholesterolemia E78.00    FH: ovarian cancer Z80.41    Vitamin D deficiency E55.9       Depression Risk Factor Screening:     PHQ over the last two weeks 2/21/2018   Little interest or pleasure in doing things Not at all   Feeling down, depressed or hopeless Not at all   Total Score PHQ 2 0     Alcohol Risk Factor Screening: You do not drink alcohol or very rarely. Functional Ability and Level of Safety:   Hearing Loss  Hearing is good. Activities of Daily Living  The home contains: handrails  Patient does total self care    Fall Risk  Fall Risk Assessment, last 12 mths 2/21/2018   Able to walk? Yes   Fall in past 12 months? No   Fall with injury? -   Number of falls in past 12 months -   Fall Risk Score -       Abuse Screen  Patient is not abused    Cognitive Screening   Evaluation of Cognitive Function:  Has your family/caregiver stated any concerns about your memory: no  Normal    Patient Care Team   Patient Care Team:  Richar Butterfield MD as PCP - General  Ether Dakins, MD (Pulmonary Disease)  Jorge A Denton MD (Internal Medicine)  Rosario Duffy OD (Ophthalmology)  Logan Arias RN as Ambulatory Care Navigator [facility])    Assessment/Plan   Education and counseling provided:  Are appropriate based on today's review and evaluation  Pneumococcal Vaccine  Influenza Vaccine  Screening Mammography  Cardiovascular screening blood test  Bone mass measurement (DEXA)  Screening for glaucoma  Diabetes screening test    Diagnoses and all orders for this visit:    1. Medicare annual wellness visit, subsequent    2. Chronic obstructive pulmonary disease, unspecified COPD type ([facility]  Assessment & Plan: This condition is managed by Specialist.  Key COPD Medications             albuterol-ipratropium (DUO-NEB) 2.5 mg-0.5 mg/3 ml nebu  (Taking) 3 mL by Nebulization route two (2) times a day.  Indications: CHRONIC OBSTRUCTIVE PULMONARY DISEASE WITH BRONCHOSPASMS    ATROVENT HFA 17 mcg/actuation inhaler  (Taking) as needed. Lab Results   Component Value Date/Time    WBC 11.2 09/28/2016 03:53 PM    HGB 15.4 09/28/2016 03:53 PM    HCT 46.7 09/28/2016 03:53 PM    PLATELET 060 31/67/2606 03:53 PM         3. Tobacco use disorder    4. Hypothyroidism, unspecified type    5. Pure hypercholesterolemia    6.  Vitamin D deficiency      Health Maintenance Due   Topic Date Due    MEDICARE YEARLY EXAM  02/21/2018

## 2018-02-21 NOTE — ACP (ADVANCE CARE PLANNING)
Advance Care Planning    Advance Care Planning (ACP) Provider Conversation Snapshot    Date of ACP Conversation: 02/21/18  Persons included in Conversation:  patient  Length of ACP Conversation in minutes:  <16 minutes (Non-Billable)    Authorized Decision Maker (if patient is incapable of making informed decisions): This person is: Other Legally Authorized Decision Maker (e.g. Next of Kin)  Friend-Floresita        For Patients with Decision Making Capacity:   Values/Goals: Exploration of values, goals, and preferences if recovery is not expected, even with continued medical treatment in the event of:  Imminent death  Severe, permanent brain injury    Conversation Outcomes / Follow-Up Plan:   Recommended completion of Advance Directive form after review of ACP materials and conversation with prospective healthcare agent   Pt refuses to complete ACP document.